# Patient Record
Sex: FEMALE | Race: WHITE | NOT HISPANIC OR LATINO | Employment: UNEMPLOYED | URBAN - METROPOLITAN AREA
[De-identification: names, ages, dates, MRNs, and addresses within clinical notes are randomized per-mention and may not be internally consistent; named-entity substitution may affect disease eponyms.]

---

## 2019-01-31 ENCOUNTER — HOSPITAL ENCOUNTER (OUTPATIENT)
Dept: PREADMISSION TESTING | Facility: HOSPITAL | Age: 64
Discharge: HOME | End: 2019-01-31
Attending: OPHTHALMOLOGY | Admitting: OPHTHALMOLOGY

## 2019-02-05 ENCOUNTER — HOSPITAL ENCOUNTER (OUTPATIENT)
Dept: PREOP | Facility: HOSPITAL | Age: 64
Setting detail: HOSPITAL OUTPATIENT SURGERY
Discharge: HOME | End: 2019-02-05
Attending: OPHTHALMOLOGY | Admitting: OPHTHALMOLOGY

## 2019-02-12 ENCOUNTER — HOSPITAL ENCOUNTER (OUTPATIENT)
Dept: PREOP | Facility: HOSPITAL | Age: 64
Setting detail: HOSPITAL OUTPATIENT SURGERY
Discharge: HOME | End: 2019-02-12
Attending: OPHTHALMOLOGY | Admitting: OPHTHALMOLOGY

## 2019-03-12 ENCOUNTER — HOSPITAL ENCOUNTER (OUTPATIENT)
Dept: PREOP | Facility: HOSPITAL | Age: 64
Setting detail: HOSPITAL OUTPATIENT SURGERY
Discharge: HOME | End: 2019-03-12
Attending: OPHTHALMOLOGY | Admitting: OPHTHALMOLOGY

## 2019-04-03 ENCOUNTER — HOSPITAL ENCOUNTER (OUTPATIENT)
Dept: PREOP | Facility: HOSPITAL | Age: 64
Setting detail: HOSPITAL OUTPATIENT SURGERY
Discharge: HOME | End: 2019-04-03
Attending: OPHTHALMOLOGY | Admitting: OPHTHALMOLOGY

## 2019-07-25 ENCOUNTER — HOSPITAL ENCOUNTER (OUTPATIENT)
Facility: HOSPITAL | Age: 64
Setting detail: HOSPITAL OUTPATIENT SURGERY
End: 2019-07-25
Attending: OPHTHALMOLOGY | Admitting: OPHTHALMOLOGY

## 2019-07-25 RX ORDER — SODIUM CHLORIDE, SODIUM LACTATE, POTASSIUM CHLORIDE, CALCIUM CHLORIDE 600; 310; 30; 20 MG/100ML; MG/100ML; MG/100ML; MG/100ML
20 INJECTION, SOLUTION INTRAVENOUS CONTINUOUS
Status: CANCELLED | OUTPATIENT
Start: 2019-07-25

## 2019-07-25 RX ORDER — CIPROFLOXACIN HYDROCHLORIDE 3.5 MG/ML
1 SOLUTION/ DROPS TOPICAL
Status: CANCELLED | OUTPATIENT
Start: 2019-07-25 | End: 2019-07-25

## 2019-07-25 RX ORDER — PHENYLEPHRINE HCL 2.5 %
1 DROPS OPHTHALMIC (EYE)
Status: CANCELLED | OUTPATIENT
Start: 2019-07-25 | End: 2019-07-25

## 2019-07-25 RX ORDER — HOMATROPINE HYDROBROMIDE OPHTHALMIC 50 MG/ML
1 SOLUTION OPHTHALMIC
Status: CANCELLED | OUTPATIENT
Start: 2019-07-25 | End: 2019-07-25

## 2019-07-29 ENCOUNTER — APPOINTMENT (OUTPATIENT)
Dept: PREADMISSION TESTING | Facility: HOSPITAL | Age: 64
End: 2019-07-29

## 2019-08-29 ENCOUNTER — LAB REQUISITION (OUTPATIENT)
Dept: LAB | Facility: HOSPITAL | Age: 64
End: 2019-08-29

## 2019-08-29 DIAGNOSIS — Z00.00 ENCOUNTER FOR GENERAL ADULT MEDICAL EXAMINATION WITHOUT ABNORMAL FINDINGS: ICD-10-CM

## 2019-08-29 LAB
ANION GAP SERPL CALCULATED.3IONS-SCNC: 14.2 MMOL/L (ref 5–15)
BUN BLD-MCNC: 17 MG/DL (ref 8–20)
BUN/CREAT SERPL: 21.3 (ref 5.4–26.2)
CALCIUM SPEC-SCNC: 8.7 MG/DL (ref 8.9–10.3)
CHLORIDE SERPL-SCNC: 105 MMOL/L (ref 101–111)
CO2 SERPL-SCNC: 22 MMOL/L (ref 22–32)
CREAT BLD-MCNC: 0.8 MG/DL (ref 0.4–1)
DEPRECATED RDW RBC AUTO: 43.8 FL (ref 37–54)
ERYTHROCYTE [DISTWIDTH] IN BLOOD BY AUTOMATED COUNT: 13.4 % (ref 12.3–15.4)
GFR SERPL CREATININE-BSD FRML MDRD: 73 ML/MIN/1.73
GLUCOSE BLD-MCNC: 118 MG/DL (ref 65–99)
HCT VFR BLD AUTO: 36.9 % (ref 34–46.6)
HGB BLD-MCNC: 12.3 G/DL (ref 12–15.9)
MCH RBC QN AUTO: 30.7 PG (ref 26.6–33)
MCHC RBC AUTO-ENTMCNC: 33.2 G/DL (ref 31.5–35.7)
MCV RBC AUTO: 92.3 FL (ref 79–97)
PLATELET # BLD AUTO: 261 10*3/MM3 (ref 140–450)
PMV BLD AUTO: 8.7 FL (ref 6–12)
POTASSIUM BLD-SCNC: 4.2 MMOL/L (ref 3.6–5.1)
RBC # BLD AUTO: 4 10*6/MM3 (ref 3.77–5.28)
SODIUM BLD-SCNC: 137 MMOL/L (ref 136–144)
WBC NRBC COR # BLD: 11.3 10*3/MM3 (ref 3.4–10.8)

## 2019-08-29 PROCEDURE — 85027 COMPLETE CBC AUTOMATED: CPT

## 2019-08-29 PROCEDURE — 80048 BASIC METABOLIC PNL TOTAL CA: CPT

## 2019-11-19 ENCOUNTER — PROCEDURE VISIT (OUTPATIENT)
Dept: NEUROLOGY | Facility: CLINIC | Age: 64
End: 2019-11-19

## 2019-11-19 DIAGNOSIS — R53.1 WEAKNESS: ICD-10-CM

## 2019-11-19 DIAGNOSIS — M79.672 LEFT FOOT PAIN: Primary | ICD-10-CM

## 2019-11-19 PROBLEM — I63.9 CEREBROVASCULAR ACCIDENT (CVA) (HCC): Status: ACTIVE | Noted: 2019-11-19

## 2019-11-19 PROBLEM — K21.9 GASTROESOPHAGEAL REFLUX DISEASE: Status: ACTIVE | Noted: 2019-11-19

## 2019-11-19 PROCEDURE — 95885 MUSC TST DONE W/NERV TST LIM: CPT | Performed by: PSYCHIATRY & NEUROLOGY

## 2019-11-19 PROCEDURE — 95909 NRV CNDJ TST 5-6 STUDIES: CPT | Performed by: PSYCHIATRY & NEUROLOGY

## 2019-11-19 NOTE — PROGRESS NOTES
EMG and Nerve Conduction Studies    The complete report includes the data sheets.     Referring Doctor: Claudio Abraham MD    History: This patient is a 64-year-old female who has noted some turning down of her toes recently as well as some pain or numbness on the ball of the left foot.  There is a family history of Charcot-May-Tooth disease.    Results:     1.  The left sural sensory and medial plantar sensory studies were normal.    2.  The right and left peroneal and right and left tibial motor conduction studies were normal.    3.  EMG of the muscles examined in the left lower extremity in the L3-S1 myotomes were normal.  Further EMG was deferred as the patient is on anticoagulation with Coumadin.    Impression:    This is a normal study.  The nerve conduction studies are normal showing no evidence of peripheral neuropathy.  EMG of the muscles examined in the left lower extremity showed no acute or chronic neurogenic changes.      Joseph Seipel, M.D.

## 2020-02-18 NOTE — PROGRESS NOTES
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  Patient developed acute cerebrovascular accident in  2019 and  was placed on anticoagulation therapy with warfarin.  She was subsequently switched to Eliquis and in 2019, she developed what appeared to have been a TIA/stroke was admitted to [facility].  Report that patient had a subacute internal capsule stroke.  Follow up  BALTAZAR did not reveal any further evidence of thrombosis.  Patient was discharged on aspirin and Plavix.  Her neurologist has recommended a hypercoagulable work-up given her significant history of vascular issues.    Past Medical History:   Diagnosis Date   • Arthritis    • Blood clotting disorder (CMS/HCC)    • Stroke (CMS/HCC)        Past Surgical History:   Procedure Laterality Date   • EYE SURGERY      5 times   • HYSTERECTOMY Bilateral          Current Outpatient Medications:   •  aspirin 325 MG tablet, Take 325 mg by mouth Daily., Disp: , Rfl:   •  atorvastatin (LIPITOR) 40 MG tablet, 40 mg., Disp: , Rfl:   •  clopidogrel (PLAVIX) 75 MG tablet, , Disp: , Rfl:   •  pantoprazole (PROTONIX) 40 MG EC tablet, , Disp: , Rfl:     No Known Allergies      There is no immunization history on file for this patient.    Family History   Problem Relation Age of Onset   • Ovarian cancer Mother 45   • Bleeding Disorder Mother    • Arthritis Mother    • Osteoporosis Mother    • Prostate cancer Father    • Alzheimer's disease Father        Cancer-related family history includes Ovarian cancer (age of onset: 45) in her mother;  "Prostate cancer in her father.    Social History     Tobacco Use   • Smoking status: Never Smoker   • Smokeless tobacco: Never Used   Substance Use Topics   • Alcohol use: Never     Frequency: Never   • Drug use: Never       ROS:    Review of Systems   Constitutional: Negative for chills and fever.   HENT: Negative for ear pain, mouth sores, nosebleeds and sore throat.    Eyes: Negative for photophobia and visual disturbance.   Respiratory: Negative for wheezing and stridor.    Cardiovascular: Negative for chest pain and palpitations.   Gastrointestinal: Negative for abdominal pain, diarrhea, nausea and vomiting.   Endocrine: Negative for cold intolerance and heat intolerance.   Genitourinary: Negative for dysuria and hematuria.   Musculoskeletal: Negative for joint swelling and neck stiffness.   Skin: Negative for color change and rash.   Neurological: Negative for seizures and syncope.   Hematological: Negative for adenopathy.        No obvious bleeding   Psychiatric/Behavioral: Negative for agitation, confusion and hallucinations.   All other systems reviewed and are negative.    Subjective:  The patient is here for a follow up with Dr. Mendosa. She stated she had a stroke back in August of 2019 and stated she had another episode a couple months ago but not sure if it was a stroke or not. The patient denies any family history of blood disorders. The patient denies any blood disorders in her history. She denies any symptoms or complaints. The patient has a follow up with her neurologist next month.     PCP: Praful Andrade      Gynecology: The patient had her first menses at the age of 14. She had one live birth at age 39 and two miscarriages. The patient had a complete hysterectomy in 2002.     Objective:    Vitals:    02/19/20 1218   BP: 133/83   Pulse: 76   Resp: 18   Temp: 98.3 °F (36.8 °C)   Weight: 69.9 kg (154 lb 3.2 oz)   Height: 160 cm (63\")   PainSc: 0-No pain       ECOG  (1) Restricted in physically strenuous " activity, ambulatory and able to do work of light nature    Physical Exam:    Physical Exam   Constitutional: She is oriented to person, place, and time. No distress.   HENT:   Head: Normocephalic and atraumatic.   Eyes: Conjunctivae and EOM are normal. Right eye exhibits no discharge. Left eye exhibits no discharge. No scleral icterus.   Neck: Normal range of motion. Neck supple. No thyromegaly present.   Cardiovascular: Normal rate, regular rhythm and normal heart sounds. Exam reveals no gallop and no friction rub.   Pulmonary/Chest: Effort normal. No stridor. No respiratory distress. She has no wheezes.   Abdominal: Soft. Bowel sounds are normal. She exhibits no mass. There is no tenderness. There is no rebound and no guarding.   Musculoskeletal: Normal range of motion. She exhibits no tenderness.   Lymphadenopathy:     She has no cervical adenopathy.   Neurological: She is alert and oriented to person, place, and time. She exhibits normal muscle tone.   Skin: Skin is warm. No rash noted. She is not diaphoretic. No erythema.   Psychiatric: She has a normal mood and affect. Her behavior is normal.   Nursing note and vitals reviewed.      RECENT LABS  WBC   Date Value Ref Range Status   02/19/2020 8.82 3.40 - 10.80 10*3/mm3 Final     RBC   Date Value Ref Range Status   02/19/2020 3.85 3.77 - 5.28 10*6/mm3 Final     Hemoglobin   Date Value Ref Range Status   02/19/2020 12.1 12.0 - 15.9 g/dL Final     Hematocrit   Date Value Ref Range Status   02/19/2020 36.8 34.0 - 46.6 % Final     MCV   Date Value Ref Range Status   02/19/2020 95.6 79.0 - 97.0 fL Final     MCH   Date Value Ref Range Status   02/19/2020 31.4 26.6 - 33.0 pg Final     MCHC   Date Value Ref Range Status   02/19/2020 32.9 31.5 - 35.7 g/dL Final     RDW   Date Value Ref Range Status   02/19/2020 13.6 12.3 - 15.4 % Final     RDW-SD   Date Value Ref Range Status   02/19/2020 46.2 37.0 - 54.0 fl Final     MPV   Date Value Ref Range Status   02/19/2020 9.3  6.0 - 12.0 fL Final     Platelets   Date Value Ref Range Status   02/19/2020 286 140 - 450 10*3/mm3 Final     Neutrophil %   Date Value Ref Range Status   02/19/2020 62.0 42.7 - 76.0 % Final     Lymphocyte %   Date Value Ref Range Status   02/19/2020 28.7 19.6 - 45.3 % Final     Monocyte %   Date Value Ref Range Status   02/19/2020 7.0 5.0 - 12.0 % Final     Eosinophil %   Date Value Ref Range Status   02/19/2020 1.8 0.3 - 6.2 % Final     Basophil %   Date Value Ref Range Status   02/19/2020 0.5 0.0 - 1.5 % Final     Neutrophils, Absolute   Date Value Ref Range Status   02/19/2020 5.47 1.70 - 7.00 10*3/mm3 Final     Lymphocytes, Absolute   Date Value Ref Range Status   02/19/2020 2.53 0.70 - 3.10 10*3/mm3 Final     Monocytes, Absolute   Date Value Ref Range Status   02/19/2020 0.62 0.10 - 0.90 10*3/mm3 Final     Eosinophils, Absolute   Date Value Ref Range Status   02/19/2020 0.16 0.00 - 0.40 10*3/mm3 Final     Basophils, Absolute   Date Value Ref Range Status   02/19/2020 0.04 0.00 - 0.20 10*3/mm3 Final       Lab Results   Component Value Date    GLUCOSE 118 (H) 08/29/2019    BUN 17 08/29/2019    CREATININE 0.80 08/29/2019    EGFRIFNONA 73 08/29/2019    BCR 21.3 08/29/2019    K 4.2 08/29/2019    CO2 22.0 08/29/2019    CALCIUM 8.7 (L) 08/29/2019         Assessment/Plan     History of blood clots  - Factor 5 Leiden  - Antithrombin III  - Anticardiolipin Antibody, IgG / M, Qn  - Beta-2 Glycoprotein Antibodies  - Protein C Activity  - Lupus Anticoagulant  - Protein S Functional  - CBC & Differential  - JAK2 Mutation Analysis, Qual  - Prothrombin gene mutation  - Antiphospholipid Syndrome  - CBC Auto Differential    Hypercoagulable state (CMS/HCC)  - Factor 5 Leiden  - Antithrombin III  - Anticardiolipin Antibody, IgG / M, Qn  - Beta-2 Glycoprotein Antibodies  - Protein C Activity  - Lupus Anticoagulant  - Protein S Functional  - CBC & Differential  - JAK2 Mutation Analysis, Qual  - Prothrombin gene mutation  -  Antiphospholipid Syndrome  - CBC Auto Differential      1. Cerebrovascular accident  2. Evaluate for hypercoagulable state  3. Patient is currently on aspirin and Plavix      Plans:    Complete a hypercoagulable work-up.  Would also include a CBC and Karl 2 analysis.  Patient will continue on aspirin and Plavix.  She will follow-up with her neurologist next month.  I will plan to see her again in 3 weeks review the results and to make further recommendations.      Thank you very much allowing participate in the care of Ms. Bliss, I will keep you updated on her progress.          During this visit, I spent 3-10 minutes counseling the patient regarding tobacco cessation.     Patient verbalized understanding and is in agreement of the above plan.

## 2020-02-19 ENCOUNTER — APPOINTMENT (OUTPATIENT)
Dept: LAB | Facility: HOSPITAL | Age: 65
End: 2020-02-19

## 2020-02-19 ENCOUNTER — CONSULT (OUTPATIENT)
Dept: ONCOLOGY | Facility: CLINIC | Age: 65
End: 2020-02-19

## 2020-02-19 VITALS
DIASTOLIC BLOOD PRESSURE: 83 MMHG | WEIGHT: 154.2 LBS | SYSTOLIC BLOOD PRESSURE: 133 MMHG | HEART RATE: 76 BPM | RESPIRATION RATE: 18 BRPM | TEMPERATURE: 98.3 F | BODY MASS INDEX: 27.32 KG/M2 | HEIGHT: 63 IN

## 2020-02-19 DIAGNOSIS — Z86.718 HISTORY OF BLOOD CLOTS: Primary | ICD-10-CM

## 2020-02-19 DIAGNOSIS — D68.59 HYPERCOAGULABLE STATE (HCC): ICD-10-CM

## 2020-02-19 LAB
BASOPHILS # BLD AUTO: 0.04 10*3/MM3 (ref 0–0.2)
BASOPHILS NFR BLD AUTO: 0.5 % (ref 0–1.5)
DEPRECATED RDW RBC AUTO: 46.2 FL (ref 37–54)
EOSINOPHIL # BLD AUTO: 0.16 10*3/MM3 (ref 0–0.4)
EOSINOPHIL NFR BLD AUTO: 1.8 % (ref 0.3–6.2)
ERYTHROCYTE [DISTWIDTH] IN BLOOD BY AUTOMATED COUNT: 13.6 % (ref 12.3–15.4)
F5 GENE MUT ANL BLD/T: NORMAL
FACTOR II, DNA ANALYSIS: NORMAL
HCT VFR BLD AUTO: 36.8 % (ref 34–46.6)
HGB BLD-MCNC: 12.1 G/DL (ref 12–15.9)
LYMPHOCYTES # BLD AUTO: 2.53 10*3/MM3 (ref 0.7–3.1)
LYMPHOCYTES NFR BLD AUTO: 28.7 % (ref 19.6–45.3)
MCH RBC QN AUTO: 31.4 PG (ref 26.6–33)
MCHC RBC AUTO-ENTMCNC: 32.9 G/DL (ref 31.5–35.7)
MCV RBC AUTO: 95.6 FL (ref 79–97)
MONOCYTES # BLD AUTO: 0.62 10*3/MM3 (ref 0.1–0.9)
MONOCYTES NFR BLD AUTO: 7 % (ref 5–12)
NEUTROPHILS # BLD AUTO: 5.47 10*3/MM3 (ref 1.7–7)
NEUTROPHILS NFR BLD AUTO: 62 % (ref 42.7–76)
PLATELET # BLD AUTO: 286 10*3/MM3 (ref 140–450)
PMV BLD AUTO: 9.3 FL (ref 6–12)
RBC # BLD AUTO: 3.85 10*6/MM3 (ref 3.77–5.28)
WBC NRBC COR # BLD: 8.82 10*3/MM3 (ref 3.4–10.8)

## 2020-02-19 PROCEDURE — 81240 F2 GENE: CPT | Performed by: NURSE PRACTITIONER

## 2020-02-19 PROCEDURE — 36415 COLL VENOUS BLD VENIPUNCTURE: CPT | Performed by: INTERNAL MEDICINE

## 2020-02-19 PROCEDURE — 99204 OFFICE O/P NEW MOD 45 MIN: CPT | Performed by: INTERNAL MEDICINE

## 2020-02-19 PROCEDURE — 85300 ANTITHROMBIN III ACTIVITY: CPT | Performed by: NURSE PRACTITIONER

## 2020-02-19 PROCEDURE — 81241 F5 GENE: CPT | Performed by: NURSE PRACTITIONER

## 2020-02-19 PROCEDURE — 85303 CLOT INHIBIT PROT C ACTIVITY: CPT | Performed by: NURSE PRACTITIONER

## 2020-02-19 PROCEDURE — 85025 COMPLETE CBC W/AUTO DIFF WBC: CPT | Performed by: INTERNAL MEDICINE

## 2020-02-19 RX ORDER — ASPIRIN 325 MG
325 TABLET ORAL DAILY
COMMUNITY

## 2020-02-19 RX ORDER — ATORVASTATIN CALCIUM 40 MG/1
40 TABLET, FILM COATED ORAL
COMMUNITY
Start: 2019-12-24

## 2020-02-19 RX ORDER — CLOPIDOGREL BISULFATE 75 MG/1
TABLET ORAL
COMMUNITY
Start: 2020-02-04

## 2020-02-19 RX ORDER — PANTOPRAZOLE SODIUM 40 MG/1
TABLET, DELAYED RELEASE ORAL
COMMUNITY
Start: 2019-12-30

## 2020-02-20 LAB
CARDIOLIPIN IGG SER IA-ACNC: <9 GPL U/ML (ref 0–14)
CARDIOLIPIN IGM SER IA-ACNC: <9 MPL U/ML (ref 0–12)

## 2020-02-21 LAB
APTT HEX PL PPP: 0 SEC (ref 0–11)
APTT PPP: 24.6 SEC (ref 22.9–30.2)
AT III PPP CHRO-ACNC: 126 % (ref 75–120)
B2 GLYCOPROT1 IGG SER-ACNC: <9 GPI IGG UNITS (ref 0–20)
B2 GLYCOPROT1 IGM SER-ACNC: <9 GPI IGM UNITS (ref 0–32)
CARDIOLIPIN IGG SER IA-ACNC: <9 GPL U/ML (ref 0–14)
CARDIOLIPIN IGM SER IA-ACNC: <9 MPL U/ML (ref 0–12)
INR PPP: 0.9 (ref 0.9–1.1)
INTERPRETATION: NORMAL
LA NT DPL PPP: 36.5 SEC (ref 0–55)
LA NT DPL/LA NT HPL PPP-RTO: 1 RATIO (ref 0–1.4)
LA NT PLATELET PPP: 31.5 SEC (ref 0–51.9)
LUPUS ANTICOAGULANT REFLEX: NORMAL
PROT C ACT/NOR PPP: 110 % (ref 70–130)
PROTEIN S-FUNCTIONAL: 113 % (ref 63–140)
PROTHROMBIN TIME: 10 SEC (ref 9.6–11.5)
SCREEN DRVVT: 35 SEC (ref 0–47)
SCREEN DRVVT: 35.1 SEC (ref 0–47)
THROMBIN TIME: 16.8 SEC (ref 0–23)
THROMBIN TIME: 18.2 SEC (ref 0–23)

## 2020-02-22 LAB
B2 GLYCOPROT1 IGA SER-ACNC: <9 GPI IGA UNITS (ref 0–25)
B2 GLYCOPROT1 IGG SER-ACNC: <9 GPI IGG UNITS (ref 0–20)
B2 GLYCOPROT1 IGM SER-ACNC: <9 GPI IGM UNITS (ref 0–32)
VW INTERPRETATION: NORMAL

## 2020-02-25 LAB
JAK2 P.V617F BLD/T QL: NORMAL
LAB DIRECTOR NAME PROVIDER: NORMAL
LABORATORY COMMENT REPORT: NORMAL

## 2020-03-12 ENCOUNTER — APPOINTMENT (OUTPATIENT)
Dept: LAB | Facility: HOSPITAL | Age: 65
End: 2020-03-12

## 2020-03-12 ENCOUNTER — OFFICE VISIT (OUTPATIENT)
Dept: ONCOLOGY | Facility: CLINIC | Age: 65
End: 2020-03-12

## 2020-03-12 VITALS
RESPIRATION RATE: 18 BRPM | SYSTOLIC BLOOD PRESSURE: 154 MMHG | TEMPERATURE: 98.5 F | DIASTOLIC BLOOD PRESSURE: 80 MMHG | HEIGHT: 63 IN | BODY MASS INDEX: 27.46 KG/M2 | HEART RATE: 89 BPM | WEIGHT: 155 LBS

## 2020-03-12 DIAGNOSIS — Z86.718 HISTORY OF BLOOD CLOTS: ICD-10-CM

## 2020-03-12 DIAGNOSIS — D68.59 HYPERCOAGULABLE STATE (HCC): Primary | ICD-10-CM

## 2020-03-12 LAB
BASOPHILS # BLD AUTO: 0.07 10*3/MM3 (ref 0–0.2)
BASOPHILS NFR BLD AUTO: 0.7 % (ref 0–1.5)
DEPRECATED RDW RBC AUTO: 45.5 FL (ref 37–54)
EOSINOPHIL # BLD AUTO: 0.18 10*3/MM3 (ref 0–0.4)
EOSINOPHIL NFR BLD AUTO: 1.7 % (ref 0.3–6.2)
ERYTHROCYTE [DISTWIDTH] IN BLOOD BY AUTOMATED COUNT: 13.2 % (ref 12.3–15.4)
HCT VFR BLD AUTO: 37.5 % (ref 34–46.6)
HGB BLD-MCNC: 12.3 G/DL (ref 12–15.9)
LYMPHOCYTES # BLD AUTO: 2.91 10*3/MM3 (ref 0.7–3.1)
LYMPHOCYTES NFR BLD AUTO: 27.7 % (ref 19.6–45.3)
MCH RBC QN AUTO: 31.8 PG (ref 26.6–33)
MCHC RBC AUTO-ENTMCNC: 32.8 G/DL (ref 31.5–35.7)
MCV RBC AUTO: 96.9 FL (ref 79–97)
MONOCYTES # BLD AUTO: 0.57 10*3/MM3 (ref 0.1–0.9)
MONOCYTES NFR BLD AUTO: 5.4 % (ref 5–12)
NEUTROPHILS # BLD AUTO: 6.79 10*3/MM3 (ref 1.7–7)
NEUTROPHILS NFR BLD AUTO: 64.5 % (ref 42.7–76)
PLATELET # BLD AUTO: 306 10*3/MM3 (ref 140–450)
PMV BLD AUTO: 9.3 FL (ref 6–12)
RBC # BLD AUTO: 3.87 10*6/MM3 (ref 3.77–5.28)
WBC NRBC COR # BLD: 10.52 10*3/MM3 (ref 3.4–10.8)

## 2020-03-12 PROCEDURE — 99214 OFFICE O/P EST MOD 30 MIN: CPT | Performed by: INTERNAL MEDICINE

## 2020-03-12 PROCEDURE — 36415 COLL VENOUS BLD VENIPUNCTURE: CPT | Performed by: INTERNAL MEDICINE

## 2020-03-12 PROCEDURE — 85240 CLOT FACTOR VIII AHG 1 STAGE: CPT | Performed by: NURSE PRACTITIONER

## 2020-03-12 PROCEDURE — 85025 COMPLETE CBC W/AUTO DIFF WBC: CPT | Performed by: INTERNAL MEDICINE

## 2020-03-12 NOTE — PROGRESS NOTES
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  Patient developed acute cerebrovascular accident in  2019 and  was placed on anticoagulation therapy with warfarin.  She was subsequently switched to Eliquis and in 2019, she developed what appeared to have been a TIA/stroke was admitted to [facility].  Report that patient had a subacute internal capsule stroke.  Follow up  BALTAZAR did not reveal any further evidence of thrombosis.  Patient was discharged on aspirin and Plavix.  Her neurologist has recommended a hypercoagulable work-up given her significant history of vascular issues.  · 2020.  Patient had a hypercoagulable work-up including lupus anticoagulant which was negative.  Her coagulation panel was normal.  Anticardiolipin antibodies were normal,Beta-2 glycoprotein was normal, factor V Leiden was not mutated.  Antithrombin III was elevated to 126, protein C was normal, protein S was normal, but Karl 2 was negative, prothrombin gene was not mutated she has a normal CBC.      Past Medical History:   Diagnosis Date   • Arthritis    • Blood clotting disorder (CMS/HCC)    • Stroke (CMS/HCC)        Past Surgical History:   Procedure Laterality Date   • EYE SURGERY  2019    5 times   • HYSTERECTOMY Bilateral          Current Outpatient Medications:   •  aspirin 325 MG tablet, Take 325 mg by mouth Daily., Disp: , Rfl:   •  atorvastatin (LIPITOR) 40 MG tablet, 40 mg., Disp: , Rfl:   •  clopidogrel (PLAVIX) 75 MG tablet, , Disp: , Rfl:   •  pantoprazole (PROTONIX) 40 MG EC tablet, , Disp: , Rfl:     No Known  Allergies    Family History   Problem Relation Age of Onset   • Ovarian cancer Mother 45   • Bleeding Disorder Mother    • Arthritis Mother    • Osteoporosis Mother    • Prostate cancer Father    • Alzheimer's disease Father        Cancer-related family history includes Ovarian cancer (age of onset: 45) in her mother; Prostate cancer in her father.    Social History     Tobacco Use   • Smoking status: Never Smoker   • Smokeless tobacco: Never Used   Substance Use Topics   • Alcohol use: Never     Frequency: Never   • Drug use: Never       I have reviewed the history of present illness, past medical history, family history, social history, lab results, all notes and other records since the patient was last seen on 2/19/2020.    SUBJECTIVE:  The patient is here for an appointment with Dr. Mendosa. She is alone for this appointment. The patient is to follow up with her neurologist. The patient denies any complaints or symptoms.           REVIEW OF SYSTEMS:  Review of Systems   Constitutional: Negative for chills and fever.   HENT: Negative for ear pain, mouth sores, nosebleeds and sore throat.    Eyes: Negative for photophobia and visual disturbance.   Respiratory: Negative for wheezing and stridor.    Cardiovascular: Negative for chest pain and palpitations.   Gastrointestinal: Negative for abdominal pain, diarrhea, nausea and vomiting.   Endocrine: Negative for cold intolerance and heat intolerance.   Genitourinary: Negative for dysuria and hematuria.   Musculoskeletal: Negative for joint swelling and neck stiffness.   Skin: Negative for color change and rash.   Neurological: Negative for seizures and syncope.   Hematological: Negative for adenopathy.        No obvious bleeding   Psychiatric/Behavioral: Negative for agitation, confusion and hallucinations.       OBJECTIVE:    Vitals:    03/12/20 1210   BP: 154/80   Pulse: 89   Resp: 18   Temp: 98.5 °F (36.9 °C)   TempSrc: Oral   Weight: 70.3 kg (155 lb)   Height: 160  "cm (63\")   PainSc: 0-No pain     Body mass index is 27.46 kg/m².    ECOG  (1) Restricted in physically strenuous activity, ambulatory and able to do work of light nature    Physical Exam   Constitutional: She is oriented to person, place, and time. No distress.   HENT:   Head: Normocephalic and atraumatic.   Eyes: Conjunctivae and EOM are normal. Right eye exhibits no discharge. Left eye exhibits no discharge. No scleral icterus.   Neck: Normal range of motion. Neck supple. No thyromegaly present.   Cardiovascular: Normal rate, regular rhythm and normal heart sounds. Exam reveals no gallop and no friction rub.   Pulmonary/Chest: Effort normal. No stridor. No respiratory distress. She has no wheezes.   Abdominal: Soft. Bowel sounds are normal. She exhibits no mass. There is no tenderness. There is no rebound and no guarding.   Musculoskeletal: Normal range of motion. She exhibits no tenderness.   Lymphadenopathy:     She has no cervical adenopathy.   Neurological: She is alert and oriented to person, place, and time. She exhibits normal muscle tone.   Skin: Skin is warm. No rash noted. She is not diaphoretic. No erythema.   Psychiatric: She has a normal mood and affect. Her behavior is normal.   Nursing note and vitals reviewed.      RECENT LABS  WBC   Date Value Ref Range Status   03/12/2020 10.52 3.40 - 10.80 10*3/mm3 Final     RBC   Date Value Ref Range Status   03/12/2020 3.87 3.77 - 5.28 10*6/mm3 Final     Hemoglobin   Date Value Ref Range Status   03/12/2020 12.3 12.0 - 15.9 g/dL Final     Hematocrit   Date Value Ref Range Status   03/12/2020 37.5 34.0 - 46.6 % Final     MCV   Date Value Ref Range Status   03/12/2020 96.9 79.0 - 97.0 fL Final     MCH   Date Value Ref Range Status   03/12/2020 31.8 26.6 - 33.0 pg Final     MCHC   Date Value Ref Range Status   03/12/2020 32.8 31.5 - 35.7 g/dL Final     RDW   Date Value Ref Range Status   03/12/2020 13.2 12.3 - 15.4 % Final     RDW-SD   Date Value Ref Range Status "   03/12/2020 45.5 37.0 - 54.0 fl Final     MPV   Date Value Ref Range Status   03/12/2020 9.3 6.0 - 12.0 fL Final     Platelets   Date Value Ref Range Status   03/12/2020 306 140 - 450 10*3/mm3 Final     Neutrophil %   Date Value Ref Range Status   03/12/2020 64.5 42.7 - 76.0 % Final     Lymphocyte %   Date Value Ref Range Status   03/12/2020 27.7 19.6 - 45.3 % Final     Monocyte %   Date Value Ref Range Status   03/12/2020 5.4 5.0 - 12.0 % Final     Eosinophil %   Date Value Ref Range Status   03/12/2020 1.7 0.3 - 6.2 % Final     Basophil %   Date Value Ref Range Status   03/12/2020 0.7 0.0 - 1.5 % Final     Neutrophils, Absolute   Date Value Ref Range Status   03/12/2020 6.79 1.70 - 7.00 10*3/mm3 Final     Lymphocytes, Absolute   Date Value Ref Range Status   03/12/2020 2.91 0.70 - 3.10 10*3/mm3 Final     Monocytes, Absolute   Date Value Ref Range Status   03/12/2020 0.57 0.10 - 0.90 10*3/mm3 Final     Eosinophils, Absolute   Date Value Ref Range Status   03/12/2020 0.18 0.00 - 0.40 10*3/mm3 Final     Basophils, Absolute   Date Value Ref Range Status   03/12/2020 0.07 0.00 - 0.20 10*3/mm3 Final       Lab Results   Component Value Date    GLUCOSE 118 (H) 08/29/2019    BUN 17 08/29/2019    CREATININE 0.80 08/29/2019    EGFRIFNONA 73 08/29/2019    BCR 21.3 08/29/2019    K 4.2 08/29/2019    CO2 22.0 08/29/2019    CALCIUM 8.7 (L) 08/29/2019         Assessment/Plan     Hypercoagulable state (CMS/HCC)  - CBC & Differential  - CBC Auto Differential  - Factor 8 Activity  - Homocysteine  - Homocysteine    History of blood clots  - Factor 8 Activity  - Homocysteine  - Homocysteine      ASSESSMENT:    · History of blood clots  · Hypercoagulable state (CMS/HCC)    1. Cerebrovascular accident  2. Thrombophilia work-up was negative  3. Patient is currently on aspirin and Plavix      PLAN:     We will check a fasting homocystine level as well as factor VIII activity.  I have not been able to determine any evidence of  hypercoagulable state from her current labs.  She will continue aspirin and Plavix.  She will follow-up with her neurologist as instructed.  I will plan to see her again in 3 months review the results and to make further recommendations.         I have reviewed labs results, imaging, vitals, and medications with the patient today. Will follow up in 3 months with me.  Patient verbalized understanding and is in agreement of the above plan.

## 2020-03-13 LAB — FACT VIII ACT/NOR PPP: 115 % ACTIVITY (ref 70–160)

## 2020-03-17 ENCOUNTER — APPOINTMENT (OUTPATIENT)
Dept: LAB | Facility: HOSPITAL | Age: 65
End: 2020-03-17

## 2020-03-17 LAB — HCYS SERPL-MCNC: 6.8 UMOL/L (ref 0–15)

## 2020-03-17 PROCEDURE — 36415 COLL VENOUS BLD VENIPUNCTURE: CPT | Performed by: INTERNAL MEDICINE

## 2020-03-17 PROCEDURE — 83090 ASSAY OF HOMOCYSTEINE: CPT | Performed by: NURSE PRACTITIONER

## 2020-06-11 ENCOUNTER — APPOINTMENT (OUTPATIENT)
Dept: LAB | Facility: HOSPITAL | Age: 65
End: 2020-06-11

## 2020-06-11 ENCOUNTER — OFFICE VISIT (OUTPATIENT)
Dept: ONCOLOGY | Facility: CLINIC | Age: 65
End: 2020-06-11

## 2020-06-11 VITALS
SYSTOLIC BLOOD PRESSURE: 110 MMHG | BODY MASS INDEX: 27.64 KG/M2 | DIASTOLIC BLOOD PRESSURE: 70 MMHG | HEART RATE: 75 BPM | RESPIRATION RATE: 18 BRPM | TEMPERATURE: 97.3 F | WEIGHT: 156 LBS | HEIGHT: 63 IN

## 2020-06-11 DIAGNOSIS — D68.59 HYPERCOAGULABLE STATE (HCC): Primary | ICD-10-CM

## 2020-06-11 LAB
BASOPHILS # BLD AUTO: 0.05 10*3/MM3 (ref 0–0.2)
BASOPHILS NFR BLD AUTO: 0.7 % (ref 0–1.5)
DEPRECATED RDW RBC AUTO: 43.3 FL (ref 37–54)
EOSINOPHIL # BLD AUTO: 0.2 10*3/MM3 (ref 0–0.4)
EOSINOPHIL NFR BLD AUTO: 3 % (ref 0.3–6.2)
ERYTHROCYTE [DISTWIDTH] IN BLOOD BY AUTOMATED COUNT: 13.1 % (ref 12.3–15.4)
HCT VFR BLD AUTO: 36.9 % (ref 34–46.6)
HGB BLD-MCNC: 12.3 G/DL (ref 12–15.9)
LYMPHOCYTES # BLD AUTO: 2.24 10*3/MM3 (ref 0.7–3.1)
LYMPHOCYTES NFR BLD AUTO: 33.3 % (ref 19.6–45.3)
MCH RBC QN AUTO: 31.3 PG (ref 26.6–33)
MCHC RBC AUTO-ENTMCNC: 33.3 G/DL (ref 31.5–35.7)
MCV RBC AUTO: 93.9 FL (ref 79–97)
MONOCYTES # BLD AUTO: 0.53 10*3/MM3 (ref 0.1–0.9)
MONOCYTES NFR BLD AUTO: 7.9 % (ref 5–12)
NEUTROPHILS # BLD AUTO: 3.71 10*3/MM3 (ref 1.7–7)
NEUTROPHILS NFR BLD AUTO: 55.1 % (ref 42.7–76)
PLATELET # BLD AUTO: 274 10*3/MM3 (ref 140–450)
PMV BLD AUTO: 9.2 FL (ref 6–12)
RBC # BLD AUTO: 3.93 10*6/MM3 (ref 3.77–5.28)
WBC NRBC COR # BLD: 6.73 10*3/MM3 (ref 3.4–10.8)

## 2020-06-11 PROCEDURE — 85025 COMPLETE CBC W/AUTO DIFF WBC: CPT | Performed by: INTERNAL MEDICINE

## 2020-06-11 PROCEDURE — 99213 OFFICE O/P EST LOW 20 MIN: CPT | Performed by: INTERNAL MEDICINE

## 2020-06-11 NOTE — PROGRESS NOTES
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  Patient developed acute cerebrovascular accident in  2019 and  was placed on anticoagulation therapy with warfarin.  She was subsequently switched to Eliquis and in 2019, she developed what appeared to have been a TIA/stroke was admitted to [facility].  Report that patient had a subacute internal capsule stroke.  Follow up  [facility] did not reveal any further evidence of thrombosis.  Patient was discharged on aspirin and Plavix.  Her neurologist has recommended a hypercoagulable work-up given her significant history of vascular issues.  · 2020.  Patient had a hypercoagulable work-up including lupus anticoagulant which was negative.  Her coagulation panel was normal.  Anticardiolipin antibodies were normal,Beta-2 glycoprotein was normal, factor V Leiden was not mutated.  Antithrombin III was elevated to 126, protein C was normal, protein S was normal, but Karl 2 was negative, prothrombin gene was not mutated she has a normal CBC.  · 3/17/2020 patient had a fasting OCC level which was normal.  Factor VIII activity was also normal.  · 2020 she had a CBC, white count was 6.7, hemoglobin 12.3 and platelets are 274      Past Medical History:   Diagnosis Date   • Arthritis    • Blood clotting disorder (CMS/HCC)    • Stroke (CMS/[facility])        Past Surgical History:   Procedure Laterality Date   • EYE SURGERY  2019    5 times   • HYSTERECTOMY Bilateral          Current Outpatient Medications:   •  aspirin 325 MG tablet, Take 325 mg by mouth  Daily., Disp: , Rfl:   •  atorvastatin (LIPITOR) 40 MG tablet, 40 mg., Disp: , Rfl:   •  clopidogrel (PLAVIX) 75 MG tablet, , Disp: , Rfl:   •  pantoprazole (PROTONIX) 40 MG EC tablet, , Disp: , Rfl:     No Known Allergies    Family History   Problem Relation Age of Onset   • Ovarian cancer Mother 45   • Bleeding Disorder Mother    • Arthritis Mother    • Osteoporosis Mother    • Prostate cancer Father    • Alzheimer's disease Father        Cancer-related family history includes Ovarian cancer (age of onset: 45) in her mother; Prostate cancer in her father.    Social History     Tobacco Use   • Smoking status: Never Smoker   • Smokeless tobacco: Never Used   Substance Use Topics   • Alcohol use: Never     Frequency: Never   • Drug use: Never       I have reviewed and confirmed the accuracy of the patient's history: Chief complaint, HPI and ROS as entered by the MA/LPN/RN. Mckenna Mendosa MD 06/11/20     SUBJECTIVE:    The patient is here for follow-up appointment.  She does not have any specific complaints today.  She denies fevers, chills, nausea or vomiting.        REVIEW OF SYSTEMS:  Review of Systems   Constitutional: Negative for chills and fever.   HENT: Negative for ear pain, mouth sores, nosebleeds and sore throat.    Eyes: Negative for photophobia and visual disturbance.   Respiratory: Negative for wheezing and stridor.    Cardiovascular: Negative for chest pain and palpitations.   Gastrointestinal: Negative for abdominal pain, diarrhea, nausea and vomiting.   Endocrine: Negative for cold intolerance and heat intolerance.   Genitourinary: Negative for dysuria and hematuria.   Musculoskeletal: Negative for joint swelling and neck stiffness.   Skin: Negative for color change and rash.   Neurological: Negative for seizures and syncope.   Hematological: Negative for adenopathy.        No obvious bleeding   Psychiatric/Behavioral: Negative for agitation, confusion and hallucinations.  "      OBJECTIVE:    Vitals:    06/11/20 0929   BP: 110/70   Pulse: 75   Resp: 18   Temp: 97.3 °F (36.3 °C)   TempSrc: Temporal   Weight: 70.8 kg (156 lb)   Height: 160 cm (63\")   PainSc: 0-No pain     Body mass index is 27.63 kg/m².    ECOG  (1) Restricted in physically strenuous activity, ambulatory and able to do work of light nature    Physical Exam   Constitutional: She is oriented to person, place, and time. No distress.   HENT:   Head: Normocephalic and atraumatic.   Eyes: Conjunctivae and EOM are normal. Right eye exhibits no discharge. Left eye exhibits no discharge. No scleral icterus.   Neck: Normal range of motion. Neck supple. No thyromegaly present.   Cardiovascular: Normal rate, regular rhythm and normal heart sounds. Exam reveals no gallop and no friction rub.   Pulmonary/Chest: Effort normal. No stridor. No respiratory distress. She has no wheezes.   Abdominal: Soft. Bowel sounds are normal. She exhibits no mass. There is no tenderness. There is no rebound and no guarding.   Musculoskeletal: Normal range of motion. She exhibits no tenderness.   Lymphadenopathy:     She has no cervical adenopathy.   Neurological: She is alert and oriented to person, place, and time. She exhibits normal muscle tone.   Skin: Skin is warm. No rash noted. She is not diaphoretic. No erythema.   Psychiatric: She has a normal mood and affect. Her behavior is normal.   Nursing note and vitals reviewed.      RECENT LABS  WBC   Date Value Ref Range Status   06/11/2020 6.73 3.40 - 10.80 10*3/mm3 Final     RBC   Date Value Ref Range Status   06/11/2020 3.93 3.77 - 5.28 10*6/mm3 Final     Hemoglobin   Date Value Ref Range Status   06/11/2020 12.3 12.0 - 15.9 g/dL Final     Hematocrit   Date Value Ref Range Status   06/11/2020 36.9 34.0 - 46.6 % Final     MCV   Date Value Ref Range Status   06/11/2020 93.9 79.0 - 97.0 fL Final     MCH   Date Value Ref Range Status   06/11/2020 31.3 26.6 - 33.0 pg Final     MCHC   Date Value Ref " Range Status   06/11/2020 33.3 31.5 - 35.7 g/dL Final     RDW   Date Value Ref Range Status   06/11/2020 13.1 12.3 - 15.4 % Final     RDW-SD   Date Value Ref Range Status   06/11/2020 43.3 37.0 - 54.0 fl Final     MPV   Date Value Ref Range Status   06/11/2020 9.2 6.0 - 12.0 fL Final     Platelets   Date Value Ref Range Status   06/11/2020 274 140 - 450 10*3/mm3 Final     Neutrophil %   Date Value Ref Range Status   06/11/2020 55.1 42.7 - 76.0 % Final     Lymphocyte %   Date Value Ref Range Status   06/11/2020 33.3 19.6 - 45.3 % Final     Monocyte %   Date Value Ref Range Status   06/11/2020 7.9 5.0 - 12.0 % Final     Eosinophil %   Date Value Ref Range Status   06/11/2020 3.0 0.3 - 6.2 % Final     Basophil %   Date Value Ref Range Status   06/11/2020 0.7 0.0 - 1.5 % Final     Neutrophils, Absolute   Date Value Ref Range Status   06/11/2020 3.71 1.70 - 7.00 10*3/mm3 Final     Lymphocytes, Absolute   Date Value Ref Range Status   06/11/2020 2.24 0.70 - 3.10 10*3/mm3 Final     Monocytes, Absolute   Date Value Ref Range Status   06/11/2020 0.53 0.10 - 0.90 10*3/mm3 Final     Eosinophils, Absolute   Date Value Ref Range Status   06/11/2020 0.20 0.00 - 0.40 10*3/mm3 Final     Basophils, Absolute   Date Value Ref Range Status   06/11/2020 0.05 0.00 - 0.20 10*3/mm3 Final       Lab Results   Component Value Date    GLUCOSE 118 (H) 08/29/2019    BUN 17 08/29/2019    CREATININE 0.80 08/29/2019    EGFRIFNONA 73 08/29/2019    BCR 21.3 08/29/2019    K 4.2 08/29/2019    CO2 22.0 08/29/2019    CALCIUM 8.7 (L) 08/29/2019         Assessment/Plan     Hypercoagulable state (CMS/HCC)  - CBC & Differential  - CBC Auto Differential      ASSESSMENT:    · History of blood clots  · Hypercoagulable state (CMS/HCC)    1. Cerebrovascular accident  2. Thrombophilia work-up was negative including fasting homocystine level and factor VIII activity  3. Patient is currently on aspirin and Plavix      PLANS:     Fasting homocystine level and factor  VIII activity normal.    She will continue aspirin and Plavix.    She will follow-up with her neurologist as instructed.    I will plan to see her again in 6 months review the results and to make further recommendations.         I have reviewed labs results, imaging, vitals, and medications with the patient today. Will follow up in 6 months with me.    Patient verbalized understanding and is in agreement of the above plan.

## 2020-12-08 ENCOUNTER — APPOINTMENT (OUTPATIENT)
Dept: LAB | Facility: HOSPITAL | Age: 65
End: 2020-12-08

## 2021-06-03 ENCOUNTER — ON CAMPUS - OUTPATIENT (AMBULATORY)
Dept: URBAN - METROPOLITAN AREA HOSPITAL 2 | Facility: HOSPITAL | Age: 66
End: 2021-06-03

## 2021-06-03 VITALS
DIASTOLIC BLOOD PRESSURE: 70 MMHG | DIASTOLIC BLOOD PRESSURE: 60 MMHG | HEART RATE: 66 BPM | HEART RATE: 67 BPM | SYSTOLIC BLOOD PRESSURE: 110 MMHG | SYSTOLIC BLOOD PRESSURE: 140 MMHG | RESPIRATION RATE: 16 BRPM | RESPIRATION RATE: 18 BRPM | SYSTOLIC BLOOD PRESSURE: 90 MMHG | DIASTOLIC BLOOD PRESSURE: 58 MMHG | WEIGHT: 155 LBS | HEIGHT: 63 IN | OXYGEN SATURATION: 98 % | OXYGEN SATURATION: 97 % | DIASTOLIC BLOOD PRESSURE: 63 MMHG | SYSTOLIC BLOOD PRESSURE: 114 MMHG | HEART RATE: 68 BPM | SYSTOLIC BLOOD PRESSURE: 95 MMHG | OXYGEN SATURATION: 100 % | DIASTOLIC BLOOD PRESSURE: 82 MMHG | OXYGEN SATURATION: 95 % | TEMPERATURE: 97.7 F | SYSTOLIC BLOOD PRESSURE: 94 MMHG | OXYGEN SATURATION: 99 % | HEART RATE: 65 BPM | DIASTOLIC BLOOD PRESSURE: 80 MMHG

## 2021-06-03 DIAGNOSIS — R13.10 DYSPHAGIA, UNSPECIFIED: ICD-10-CM

## 2021-06-03 PROCEDURE — 43450 DILATE ESOPHAGUS 1/MULT PASS: CPT | Performed by: INTERNAL MEDICINE

## 2021-06-03 PROCEDURE — 43235 EGD DIAGNOSTIC BRUSH WASH: CPT | Performed by: INTERNAL MEDICINE

## 2024-10-30 ENCOUNTER — APPOINTMENT (OUTPATIENT)
Dept: CT IMAGING | Facility: HOSPITAL | Age: 69
End: 2024-10-30
Payer: COMMERCIAL

## 2024-10-30 ENCOUNTER — HOSPITAL ENCOUNTER (EMERGENCY)
Facility: HOSPITAL | Age: 69
Discharge: HOME | End: 2024-10-30
Attending: EMERGENCY MEDICINE
Payer: COMMERCIAL

## 2024-10-30 VITALS
OXYGEN SATURATION: 97 % | SYSTOLIC BLOOD PRESSURE: 123 MMHG | HEIGHT: 63 IN | TEMPERATURE: 98.2 F | DIASTOLIC BLOOD PRESSURE: 80 MMHG | HEART RATE: 75 BPM | RESPIRATION RATE: 15 BRPM | BODY MASS INDEX: 26.58 KG/M2 | WEIGHT: 150 LBS

## 2024-10-30 DIAGNOSIS — R22.1 NECK SWELLING: Primary | ICD-10-CM

## 2024-10-30 LAB
ANION GAP SERPL CALCULATED.3IONS-SCNC: 9.2 MMOL/L (ref 5–15)
BASOPHILS # BLD AUTO: 0.02 10*3/MM3 (ref 0–0.2)
BASOPHILS NFR BLD AUTO: 0.5 % (ref 0–1.5)
BUN SERPL-MCNC: 13 MG/DL (ref 8–23)
BUN/CREAT SERPL: 16.7 (ref 7–25)
CALCIUM SPEC-SCNC: 8.9 MG/DL (ref 8.6–10.5)
CHLORIDE SERPL-SCNC: 107 MMOL/L (ref 98–107)
CO2 SERPL-SCNC: 24.8 MMOL/L (ref 22–29)
CREAT SERPL-MCNC: 0.78 MG/DL (ref 0.57–1)
DEPRECATED RDW RBC AUTO: 45.6 FL (ref 37–54)
EGFRCR SERPLBLD CKD-EPI 2021: 83.4 ML/MIN/1.73
EOSINOPHIL # BLD AUTO: 0.39 10*3/MM3 (ref 0–0.4)
EOSINOPHIL NFR BLD AUTO: 9.6 % (ref 0.3–6.2)
ERYTHROCYTE [DISTWIDTH] IN BLOOD BY AUTOMATED COUNT: 12.7 % (ref 12.3–15.4)
GLUCOSE SERPL-MCNC: 97 MG/DL (ref 65–99)
HCT VFR BLD AUTO: 39.5 % (ref 34–46.6)
HGB BLD-MCNC: 12.7 G/DL (ref 12–15.9)
HOLD SPECIMEN: NORMAL
IMM GRANULOCYTES # BLD AUTO: 0 10*3/MM3 (ref 0–0.05)
IMM GRANULOCYTES NFR BLD AUTO: 0 % (ref 0–0.5)
LYMPHOCYTES # BLD AUTO: 0.82 10*3/MM3 (ref 0.7–3.1)
LYMPHOCYTES NFR BLD AUTO: 20.1 % (ref 19.6–45.3)
MCH RBC QN AUTO: 31.4 PG (ref 26.6–33)
MCHC RBC AUTO-ENTMCNC: 32.2 G/DL (ref 31.5–35.7)
MCV RBC AUTO: 97.5 FL (ref 79–97)
MONOCYTES # BLD AUTO: 0.55 10*3/MM3 (ref 0.1–0.9)
MONOCYTES NFR BLD AUTO: 13.5 % (ref 5–12)
NEUTROPHILS NFR BLD AUTO: 2.3 10*3/MM3 (ref 1.7–7)
NEUTROPHILS NFR BLD AUTO: 56.3 % (ref 42.7–76)
NRBC BLD AUTO-RTO: 0 /100 WBC (ref 0–0.2)
PLATELET # BLD AUTO: 174 10*3/MM3 (ref 140–450)
PMV BLD AUTO: 9.7 FL (ref 6–12)
POTASSIUM SERPL-SCNC: 4.4 MMOL/L (ref 3.5–5.2)
RBC # BLD AUTO: 4.05 10*6/MM3 (ref 3.77–5.28)
SODIUM SERPL-SCNC: 141 MMOL/L (ref 136–145)
WBC NRBC COR # BLD AUTO: 4.08 10*3/MM3 (ref 3.4–10.8)
WHOLE BLOOD HOLD COAG: NORMAL

## 2024-10-30 PROCEDURE — 85025 COMPLETE CBC W/AUTO DIFF WBC: CPT | Performed by: EMERGENCY MEDICINE

## 2024-10-30 PROCEDURE — 99285 EMERGENCY DEPT VISIT HI MDM: CPT

## 2024-10-30 PROCEDURE — 80048 BASIC METABOLIC PNL TOTAL CA: CPT | Performed by: EMERGENCY MEDICINE

## 2024-10-30 PROCEDURE — 25510000001 IOPAMIDOL PER 1 ML: Performed by: EMERGENCY MEDICINE

## 2024-10-30 PROCEDURE — 70450 CT HEAD/BRAIN W/O DYE: CPT

## 2024-10-30 PROCEDURE — 70491 CT SOFT TISSUE NECK W/DYE: CPT

## 2024-10-30 RX ORDER — SODIUM CHLORIDE 0.9 % (FLUSH) 0.9 %
10 SYRINGE (ML) INJECTION AS NEEDED
Status: DISCONTINUED | OUTPATIENT
Start: 2024-10-30 | End: 2024-10-30 | Stop reason: HOSPADM

## 2024-10-30 RX ORDER — IOPAMIDOL 755 MG/ML
100 INJECTION, SOLUTION INTRAVASCULAR
Status: COMPLETED | OUTPATIENT
Start: 2024-10-30 | End: 2024-10-30

## 2024-10-30 RX ADMIN — IOPAMIDOL 100 ML: 755 INJECTION, SOLUTION INTRAVENOUS at 10:39

## 2024-10-30 NOTE — DISCHARGE INSTRUCTIONS
You were seen and evaluated for some swelling and discomfort to the right side of your neck.  CT scan soft tissue's with neck shows no vascular occlusion.  There are no mass findings.  No emergent findings on CT.  CT brain shows no hemorrhage or swelling noted.  Please follow-up with your primary physician return immediately to the emergency department if you develop any worsening symptoms signs or concerns.

## 2024-10-30 NOTE — ED NOTES
Pt c/o feeling a lump to the back right side of neck that is moving up her skull she sts.   Pt concerned that it might be a blood clot.  She had a  5 years ago and wants to rule that out.